# Patient Record
Sex: MALE | HISPANIC OR LATINO | Employment: OTHER | URBAN - METROPOLITAN AREA
[De-identification: names, ages, dates, MRNs, and addresses within clinical notes are randomized per-mention and may not be internally consistent; named-entity substitution may affect disease eponyms.]

---

## 2023-09-26 ENCOUNTER — APPOINTMENT (OUTPATIENT)
Dept: CT IMAGING | Facility: CLINIC | Age: 23
End: 2023-09-26
Attending: EMERGENCY MEDICINE

## 2023-09-26 ENCOUNTER — HOSPITAL ENCOUNTER (EMERGENCY)
Facility: CLINIC | Age: 23
Discharge: HOME | End: 2023-09-27
Attending: EMERGENCY MEDICINE

## 2023-09-26 DIAGNOSIS — S46.012A TRAUMATIC INCOMPLETE TEAR OF LEFT ROTATOR CUFF, INITIAL ENCOUNTER: Primary | ICD-10-CM

## 2023-09-26 LAB
ABO/RH(D): NORMAL
ANTIBODY SCREEN: NEGATIVE
CREAT BLD-MCNC: 0.8 MG/DL (ref 0.7–1.3)
EGFRCR SERPLBLD CKD-EPI 2021: >60 ML/MIN/1.73M2
SPECIMEN EXPIRATION DATE: NORMAL

## 2023-09-26 PROCEDURE — 72125 CT NECK SPINE W/O DYE: CPT | Mod: 26 | Performed by: RADIOLOGY

## 2023-09-26 PROCEDURE — 93308 TTE F-UP OR LMTD: CPT | Mod: 26 | Performed by: EMERGENCY MEDICINE

## 2023-09-26 PROCEDURE — 76705 ECHO EXAM OF ABDOMEN: CPT | Mod: 26 | Performed by: EMERGENCY MEDICINE

## 2023-09-26 PROCEDURE — 36415 COLL VENOUS BLD VENIPUNCTURE: CPT | Performed by: EMERGENCY MEDICINE

## 2023-09-26 PROCEDURE — 96374 THER/PROPH/DIAG INJ IV PUSH: CPT | Mod: 59 | Performed by: EMERGENCY MEDICINE

## 2023-09-26 PROCEDURE — 82077 ASSAY SPEC XCP UR&BREATH IA: CPT | Performed by: EMERGENCY MEDICINE

## 2023-09-26 PROCEDURE — 99285 EMERGENCY DEPT VISIT HI MDM: CPT | Mod: 25 | Performed by: EMERGENCY MEDICINE

## 2023-09-26 PROCEDURE — 72128 CT CHEST SPINE W/O DYE: CPT

## 2023-09-26 PROCEDURE — 93308 TTE F-UP OR LMTD: CPT | Performed by: EMERGENCY MEDICINE

## 2023-09-26 PROCEDURE — 76705 ECHO EXAM OF ABDOMEN: CPT | Mod: 59 | Performed by: EMERGENCY MEDICINE

## 2023-09-26 PROCEDURE — 72131 CT LUMBAR SPINE W/O DYE: CPT | Mod: 26 | Performed by: RADIOLOGY

## 2023-09-26 PROCEDURE — 85610 PROTHROMBIN TIME: CPT | Performed by: EMERGENCY MEDICINE

## 2023-09-26 PROCEDURE — 82565 ASSAY OF CREATININE: CPT

## 2023-09-26 PROCEDURE — 70450 CT HEAD/BRAIN W/O DYE: CPT | Mod: 26 | Performed by: RADIOLOGY

## 2023-09-26 PROCEDURE — 71260 CT THORAX DX C+: CPT

## 2023-09-26 PROCEDURE — 76604 US EXAM CHEST: CPT | Mod: 59 | Performed by: EMERGENCY MEDICINE

## 2023-09-26 PROCEDURE — 99203 OFFICE O/P NEW LOW 30 MIN: CPT | Mod: GC | Performed by: SURGERY

## 2023-09-26 PROCEDURE — 682N000003 HC TRAUMA EVALUATION W/O CC LEVEL II: Performed by: EMERGENCY MEDICINE

## 2023-09-26 PROCEDURE — 999N000188 HC STATISTIC TRAUMA CODE W/O ACCESS

## 2023-09-26 PROCEDURE — 80053 COMPREHEN METABOLIC PANEL: CPT | Performed by: EMERGENCY MEDICINE

## 2023-09-26 PROCEDURE — 76604 US EXAM CHEST: CPT | Mod: 26 | Performed by: EMERGENCY MEDICINE

## 2023-09-26 PROCEDURE — 85025 COMPLETE CBC W/AUTO DIFF WBC: CPT | Performed by: EMERGENCY MEDICINE

## 2023-09-26 PROCEDURE — 250N000011 HC RX IP 250 OP 636: Performed by: EMERGENCY MEDICINE

## 2023-09-26 PROCEDURE — 71260 CT THORAX DX C+: CPT | Mod: 26 | Performed by: RADIOLOGY

## 2023-09-26 PROCEDURE — 70450 CT HEAD/BRAIN W/O DYE: CPT

## 2023-09-26 PROCEDURE — 72125 CT NECK SPINE W/O DYE: CPT

## 2023-09-26 PROCEDURE — 72128 CT CHEST SPINE W/O DYE: CPT | Mod: 26 | Performed by: RADIOLOGY

## 2023-09-26 PROCEDURE — 72131 CT LUMBAR SPINE W/O DYE: CPT

## 2023-09-26 PROCEDURE — 74177 CT ABD & PELVIS W/CONTRAST: CPT | Mod: 26 | Performed by: RADIOLOGY

## 2023-09-26 PROCEDURE — 86900 BLOOD TYPING SEROLOGIC ABO: CPT | Performed by: EMERGENCY MEDICINE

## 2023-09-26 PROCEDURE — 85730 THROMBOPLASTIN TIME PARTIAL: CPT | Performed by: EMERGENCY MEDICINE

## 2023-09-26 RX ORDER — LIDOCAINE 40 MG/G
CREAM TOPICAL
Status: DISCONTINUED | OUTPATIENT
Start: 2023-09-26 | End: 2023-09-27 | Stop reason: HOSPADM

## 2023-09-26 RX ORDER — IOPAMIDOL 755 MG/ML
111 INJECTION, SOLUTION INTRAVASCULAR ONCE
Status: COMPLETED | OUTPATIENT
Start: 2023-09-26 | End: 2023-09-26

## 2023-09-26 RX ADMIN — IOPAMIDOL 111 ML: 755 INJECTION, SOLUTION INTRAVENOUS at 23:40

## 2023-09-26 NOTE — LETTER
September 27, 2023      To Whom It May Concern:      Jose Nguyễn was seen in our Emergency Department today, 09/27/23.  I expect his condition to improve over the next 2 days.  He may return to work/school when improved.    Sincerely,        Joselito Rahman DO

## 2023-09-27 ENCOUNTER — APPOINTMENT (OUTPATIENT)
Dept: GENERAL RADIOLOGY | Facility: CLINIC | Age: 23
End: 2023-09-27
Attending: EMERGENCY MEDICINE

## 2023-09-27 VITALS
WEIGHT: 185 LBS | HEIGHT: 67 IN | OXYGEN SATURATION: 98 % | DIASTOLIC BLOOD PRESSURE: 81 MMHG | BODY MASS INDEX: 29.03 KG/M2 | SYSTOLIC BLOOD PRESSURE: 123 MMHG | RESPIRATION RATE: 14 BRPM | HEART RATE: 62 BPM | TEMPERATURE: 98 F

## 2023-09-27 LAB
ALBUMIN SERPL BCG-MCNC: 4.5 G/DL (ref 3.5–5.2)
ALP SERPL-CCNC: 72 U/L (ref 40–129)
ALT SERPL W P-5'-P-CCNC: 44 U/L (ref 0–70)
ANION GAP SERPL CALCULATED.3IONS-SCNC: 14 MMOL/L (ref 7–15)
APTT PPP: 28 SECONDS (ref 22–38)
AST SERPL W P-5'-P-CCNC: 45 U/L (ref 0–45)
BASOPHILS # BLD AUTO: 0.1 10E3/UL (ref 0–0.2)
BASOPHILS NFR BLD AUTO: 1 %
BILIRUB SERPL-MCNC: 1 MG/DL
BUN SERPL-MCNC: 18.9 MG/DL (ref 6–20)
CALCIUM SERPL-MCNC: 9.4 MG/DL (ref 8.6–10)
CHLORIDE SERPL-SCNC: 104 MMOL/L (ref 98–107)
CREAT SERPL-MCNC: 0.77 MG/DL (ref 0.67–1.17)
DEPRECATED HCO3 PLAS-SCNC: 21 MMOL/L (ref 22–29)
EGFRCR SERPLBLD CKD-EPI 2021: >90 ML/MIN/1.73M2
EOSINOPHIL # BLD AUTO: 0.1 10E3/UL (ref 0–0.7)
EOSINOPHIL NFR BLD AUTO: 1 %
ERYTHROCYTE [DISTWIDTH] IN BLOOD BY AUTOMATED COUNT: 13 % (ref 10–15)
ETHANOL SERPL-MCNC: <0.01 G/DL
GLUCOSE SERPL-MCNC: 96 MG/DL (ref 70–99)
HCT VFR BLD AUTO: 42.8 % (ref 40–53)
HGB BLD-MCNC: 14.8 G/DL (ref 13.3–17.7)
HOLD SPECIMEN: NORMAL
IMM GRANULOCYTES # BLD: 0 10E3/UL
IMM GRANULOCYTES NFR BLD: 0 %
INR PPP: 1.01 (ref 0.85–1.15)
LYMPHOCYTES # BLD AUTO: 1.4 10E3/UL (ref 0.8–5.3)
LYMPHOCYTES NFR BLD AUTO: 16 %
MCH RBC QN AUTO: 28.8 PG (ref 26.5–33)
MCHC RBC AUTO-ENTMCNC: 34.6 G/DL (ref 31.5–36.5)
MCV RBC AUTO: 83 FL (ref 78–100)
MONOCYTES # BLD AUTO: 0.8 10E3/UL (ref 0–1.3)
MONOCYTES NFR BLD AUTO: 8 %
NEUTROPHILS # BLD AUTO: 6.8 10E3/UL (ref 1.6–8.3)
NEUTROPHILS NFR BLD AUTO: 74 %
NRBC # BLD AUTO: 0 10E3/UL
NRBC BLD AUTO-RTO: 0 /100
PLATELET # BLD AUTO: 353 10E3/UL (ref 150–450)
POTASSIUM SERPL-SCNC: 3.7 MMOL/L (ref 3.4–5.3)
PROT SERPL-MCNC: 7.7 G/DL (ref 6.4–8.3)
RBC # BLD AUTO: 5.14 10E6/UL (ref 4.4–5.9)
SODIUM SERPL-SCNC: 139 MMOL/L (ref 135–145)
WBC # BLD AUTO: 9.2 10E3/UL (ref 4–11)

## 2023-09-27 PROCEDURE — 73030 X-RAY EXAM OF SHOULDER: CPT | Mod: 26 | Performed by: RADIOLOGY

## 2023-09-27 PROCEDURE — 73030 X-RAY EXAM OF SHOULDER: CPT | Mod: LT

## 2023-09-27 PROCEDURE — 250N000011 HC RX IP 250 OP 636: Performed by: EMERGENCY MEDICINE

## 2023-09-27 RX ORDER — HYDROCODONE BITARTRATE AND ACETAMINOPHEN 5; 325 MG/1; MG/1
1 TABLET ORAL EVERY 6 HOURS PRN
Qty: 10 TABLET | Refills: 0 | Status: SHIPPED | OUTPATIENT
Start: 2023-09-27 | End: 2023-09-30

## 2023-09-27 RX ORDER — IBUPROFEN 600 MG/1
600 TABLET, FILM COATED ORAL EVERY 6 HOURS PRN
Qty: 30 TABLET | Refills: 0 | Status: SHIPPED | OUTPATIENT
Start: 2023-09-27

## 2023-09-27 RX ORDER — HYDROMORPHONE HYDROCHLORIDE 1 MG/ML
0.5 INJECTION, SOLUTION INTRAMUSCULAR; INTRAVENOUS; SUBCUTANEOUS
Status: COMPLETED | OUTPATIENT
Start: 2023-09-27 | End: 2023-09-27

## 2023-09-27 RX ADMIN — HYDROMORPHONE HYDROCHLORIDE 0.5 MG: 1 INJECTION, SOLUTION INTRAMUSCULAR; INTRAVENOUS; SUBCUTANEOUS at 00:17

## 2023-09-27 ASSESSMENT — ACTIVITIES OF DAILY LIVING (ADL): ADLS_ACUITY_SCORE: 35

## 2023-09-27 NOTE — ED TRIAGE NOTES
Patient presents after a fall this afternoon at work, fell from stated 20 meters onto his left side, denies hitting his head or LOC from fall, complains of pain in left shoulder, limited ROM due to pain, 8/10, no meds PTA, decrease sensation in LUE     Triage Assessment       Row Name 09/26/23 0650       Triage Assessment (Adult)    Airway WDL WDL       Respiratory WDL    Respiratory WDL WDL       Skin Circulation/Temperature WDL    Skin Circulation/Temperature WDL WDL       Cardiac WDL    Cardiac WDL WDL       Peripheral/Neurovascular WDL    Peripheral Neurovascular WDL WDL       Cognitive/Neuro/Behavioral WDL    Cognitive/Neuro/Behavioral WDL WDL

## 2023-09-27 NOTE — ED PROVIDER NOTES
"ED Provider Note  [facility]      History     Chief Complaint   Patient presents with    Fall     The history is limited by a language barrier. A  was used ((Kinyarwanda)).     Jose Nguyễn is a 23 year old male with no PMH listed in EPIC who presents to the ED following a fall this afternoon at work. He states falling 10 -15 meters onto his left side (about 2-3 stories) at 2pm today. He states falling from a roof onto grass. Denies hitting his head or LOC. Complains of pain in left shoulder, limited ROM due to pain, 8/10. He also states pain throughout his arm and left lower back with movement. He is able to walk, albeit with some difficulty. No meds PTA. He denies any chronic medical issues and takes no medications.     Past Medical History  No past medical history on file.  No past surgical history on file.  HYDROcodone-acetaminophen (NORCO) 5-325 MG tablet  ibuprofen (ADVIL/MOTRIN) 600 MG tablet      No Known Allergies  Family History  No family history on file.  Social History       Past medical history, past surgical history, medications, allergies, family history, and social history were reviewed with the patient. No additional pertinent items.      A complete review of systems was performed with pertinent positives and negatives noted in the HPI, and all other systems negative.    Physical Exam   BP: 130/86  Pulse: 74  Temp: 98.4  F (36.9  C)  Resp: 18  Height: 170 cm (5' 6.93\")  Weight: 83.9 kg (185 lb)  SpO2: 99 %  Physical Exam  Vitals and nursing note reviewed.   Constitutional:       General: He is in acute distress.      Appearance: Normal appearance.   HENT:      Head: Normocephalic and atraumatic.      Nose: Nose normal.   Eyes:      Pupils: Pupils are equal, round, and reactive to light.   Neck:      Comments: Cervical collar applied during exam  Cardiovascular:      Rate and Rhythm: Normal rate and regular rhythm.   Pulmonary:      " Effort: Pulmonary effort is normal.   Abdominal:      General: There is no distension.      Palpations: Abdomen is soft.      Tenderness: There is no abdominal tenderness. There is no guarding.   Musculoskeletal:         General: No deformity. Normal range of motion.        Arms:       Cervical back: Normal range of motion and neck supple. No rigidity.   Skin:     General: Skin is warm.   Neurological:      Mental Status: He is alert and oriented to person, place, and time.   Psychiatric:         Mood and Affect: Mood normal.           ED Course, Procedures, & Data     Trauma:  Level of trauma activation: Partial  Full Primary and Secondary survey with appropriate immobilization of spine completed in exam section.  C-collar and immobilization: applied in ED on initial evaluation.  CSpine Clearance: by [employer] Criteria  GCS at arrival: 15  GCS at disposition: unchanged  Consults prior to admission or transfer: Trauma consult called at 2315  Procedures done in the ED: none  Disposition: Discharge. Patient stable after completion of evaluation.                Results for orders placed or performed during the hospital encounter of 09/26/23   POC US ABDOMEN LIMITED     Status: None (In process)    Impression    Bedside FAST (Focused Assessment with Sonography in Trauma), performed and interpreted by me.   Indication: Trauma    With the patient in Trendelenburg, the RUQ, LUQ and subxiphoid views were examined for intraabdominal and thoracic free fluid and pericardial effusion. With the patient in reverse Trendelenburg, the suprapubic view was examined for intraabdominal free fluid. Image quality was satisfactory..     Findings: There is no evidence of free fluid above or below bilateral diaphragms, in the splenorenal or hepatorenal space, or in bilateral paracolic gutters. There was no free fluid seen in the pelvis adjacent to the urinary bladder. There is no free fluid within the pericardium.        IMPRESSION:  Negative  FAST    CT Head w/o Contrast     Status: None    Narrative    EXAM: CT CERVICAL SPINE W/O CONTRAST, CT LUMBAR SPINE W/O CONTRAST, CT THORACIC SPINE W/O CONTRAST, CT HEAD W/O CONTRAST  LOCATION: [facility]  DATE: 9/26/2023    INDICATION: Head, neck, and back injury  COMPARISON: None.  TECHNIQUE:   1) Routine CT Head without IV contrast. Multiplanar reformats. Dose reduction techniques were used.   2) Routine CT Cervical Spine without IV contrast. Multiplanar reformats. Dose reduction techniques were used.   3) Routine CT Thoracic Spine without IV contrast. Multiplanar reformats. Dose reduction techniques were used.   4) Routine CT Lumbar Spine without IV contrast. Multiplanar reformats. Dose reduction techniques were used.     FINDINGS:   HEAD CT:   INTRACRANIAL CONTENTS: No intracranial hemorrhage, extraaxial collection, or mass effect.  No CT evidence of acute infarct. Normal parenchymal attenuation. Normal ventricles and sulci.     VISUALIZED ORBITS/SINUSES/MASTOIDS: No intraorbital abnormality. No paranasal sinus mucosal disease. No middle ear or mastoid effusion.    BONES/SOFT TISSUES: No acute abnormality.    CERVICAL SPINE CT:  VERTEBRA: Normal vertebral body heights. Straightening of cervical lordosis. Slight right cervical curve. No fracture or posttraumatic subluxation.     CANAL/FORAMINA: No canal or neural foraminal stenosis.    PARASPINAL: No extraspinal abnormality. Visualized lung fields are clear.    THORACIC SPINE CT:  VERTEBRA: Normal vertebral body heights. No fracture or posttraumatic subluxation.     CANAL/FORAMINA: No canal or neural foraminal stenosis.    PARASPINAL: No extraspinal abnormality.    LUMBAR SPINE CT:  VERTEBRA: Normal vertebral body heights. Straightening of lumbar lordosis. No fracture or posttraumatic subluxation.     CANAL/FORAMINA: No canal or neural foraminal stenosis.    PARASPINAL: No extraspinal abnormality.      Impression     IMPRESSION:  HEAD CT:  Normal head CT.    CERVICAL SPINE CT:  1.  No fracture or posttraumatic subluxation.  2.  No high-grade spinal canal or neural foraminal stenosis.    THORACIC SPINE CT:  No high-grade spinal canal or neural foraminal stenosis.    LUMBAR SPINE CT:  1.  No fracture or posttraumatic subluxation.  2.  No high-grade spinal canal or neural foraminal stenosis.     CT Cervical Spine w/o Contrast     Status: None    Narrative    EXAM: CT CERVICAL SPINE W/O CONTRAST, CT LUMBAR SPINE W/O CONTRAST, CT THORACIC SPINE W/O CONTRAST, CT HEAD W/O CONTRAST  LOCATION: [facility]  DATE: 9/26/2023    INDICATION: Head, neck, and back injury  COMPARISON: None.  TECHNIQUE:   1) Routine CT Head without IV contrast. Multiplanar reformats. Dose reduction techniques were used.   2) Routine CT Cervical Spine without IV contrast. Multiplanar reformats. Dose reduction techniques were used.   3) Routine CT Thoracic Spine without IV contrast. Multiplanar reformats. Dose reduction techniques were used.   4) Routine CT Lumbar Spine without IV contrast. Multiplanar reformats. Dose reduction techniques were used.     FINDINGS:   HEAD CT:   INTRACRANIAL CONTENTS: No intracranial hemorrhage, extraaxial collection, or mass effect.  No CT evidence of acute infarct. Normal parenchymal attenuation. Normal ventricles and sulci.     VISUALIZED ORBITS/SINUSES/MASTOIDS: No intraorbital abnormality. No paranasal sinus mucosal disease. No middle ear or mastoid effusion.    BONES/SOFT TISSUES: No acute abnormality.    CERVICAL SPINE CT:  VERTEBRA: Normal vertebral body heights. Straightening of cervical lordosis. Slight right cervical curve. No fracture or posttraumatic subluxation.     CANAL/FORAMINA: No canal or neural foraminal stenosis.    PARASPINAL: No extraspinal abnormality. Visualized lung fields are clear.    THORACIC SPINE CT:  VERTEBRA: Normal vertebral body heights. No fracture or  posttraumatic subluxation.     CANAL/FORAMINA: No canal or neural foraminal stenosis.    PARASPINAL: No extraspinal abnormality.    LUMBAR SPINE CT:  VERTEBRA: Normal vertebral body heights. Straightening of lumbar lordosis. No fracture or posttraumatic subluxation.     CANAL/FORAMINA: No canal or neural foraminal stenosis.    PARASPINAL: No extraspinal abnormality.      Impression    IMPRESSION:  HEAD CT:  Normal head CT.    CERVICAL SPINE CT:  1.  No fracture or posttraumatic subluxation.  2.  No high-grade spinal canal or neural foraminal stenosis.    THORACIC SPINE CT:  No high-grade spinal canal or neural foraminal stenosis.    LUMBAR SPINE CT:  1.  No fracture or posttraumatic subluxation.  2.  No high-grade spinal canal or neural foraminal stenosis.     CT Thoracic Spine w/o Contrast     Status: None    Narrative    EXAM: CT CERVICAL SPINE W/O CONTRAST, CT LUMBAR SPINE W/O CONTRAST, CT THORACIC SPINE W/O CONTRAST, CT HEAD W/O CONTRAST  LOCATION: [facility]  DATE: 9/26/2023    INDICATION: Head, neck, and back injury  COMPARISON: None.  TECHNIQUE:   1) Routine CT Head without IV contrast. Multiplanar reformats. Dose reduction techniques were used.   2) Routine CT Cervical Spine without IV contrast. Multiplanar reformats. Dose reduction techniques were used.   3) Routine CT Thoracic Spine without IV contrast. Multiplanar reformats. Dose reduction techniques were used.   4) Routine CT Lumbar Spine without IV contrast. Multiplanar reformats. Dose reduction techniques were used.     FINDINGS:   HEAD CT:   INTRACRANIAL CONTENTS: No intracranial hemorrhage, extraaxial collection, or mass effect.  No CT evidence of acute infarct. Normal parenchymal attenuation. Normal ventricles and sulci.     VISUALIZED ORBITS/SINUSES/MASTOIDS: No intraorbital abnormality. No paranasal sinus mucosal disease. No middle ear or mastoid effusion.    BONES/SOFT TISSUES: No acute  abnormality.    CERVICAL SPINE CT:  VERTEBRA: Normal vertebral body heights. Straightening of cervical lordosis. Slight right cervical curve. No fracture or posttraumatic subluxation.     CANAL/FORAMINA: No canal or neural foraminal stenosis.    PARASPINAL: No extraspinal abnormality. Visualized lung fields are clear.    THORACIC SPINE CT:  VERTEBRA: Normal vertebral body heights. No fracture or posttraumatic subluxation.     CANAL/FORAMINA: No canal or neural foraminal stenosis.    PARASPINAL: No extraspinal abnormality.    LUMBAR SPINE CT:  VERTEBRA: Normal vertebral body heights. Straightening of lumbar lordosis. No fracture or posttraumatic subluxation.     CANAL/FORAMINA: No canal or neural foraminal stenosis.    PARASPINAL: No extraspinal abnormality.      Impression    IMPRESSION:  HEAD CT:  Normal head CT.    CERVICAL SPINE CT:  1.  No fracture or posttraumatic subluxation.  2.  No high-grade spinal canal or neural foraminal stenosis.    THORACIC SPINE CT:  No high-grade spinal canal or neural foraminal stenosis.    LUMBAR SPINE CT:  1.  No fracture or posttraumatic subluxation.  2.  No high-grade spinal canal or neural foraminal stenosis.     CT Lumbar Spine w/o Contrast     Status: None    Narrative    EXAM: CT CERVICAL SPINE W/O CONTRAST, CT LUMBAR SPINE W/O CONTRAST, CT THORACIC SPINE W/O CONTRAST, CT HEAD W/O CONTRAST  LOCATION: [facility]  DATE: 9/26/2023    INDICATION: Head, neck, and back injury  COMPARISON: None.  TECHNIQUE:   1) Routine CT Head without IV contrast. Multiplanar reformats. Dose reduction techniques were used.   2) Routine CT Cervical Spine without IV contrast. Multiplanar reformats. Dose reduction techniques were used.   3) Routine CT Thoracic Spine without IV contrast. Multiplanar reformats. Dose reduction techniques were used.   4) Routine CT Lumbar Spine without IV contrast. Multiplanar reformats. Dose reduction techniques were used.      FINDINGS:   HEAD CT:   INTRACRANIAL CONTENTS: No intracranial hemorrhage, extraaxial collection, or mass effect.  No CT evidence of acute infarct. Normal parenchymal attenuation. Normal ventricles and sulci.     VISUALIZED ORBITS/SINUSES/MASTOIDS: No intraorbital abnormality. No paranasal sinus mucosal disease. No middle ear or mastoid effusion.    BONES/SOFT TISSUES: No acute abnormality.    CERVICAL SPINE CT:  VERTEBRA: Normal vertebral body heights. Straightening of cervical lordosis. Slight right cervical curve. No fracture or posttraumatic subluxation.     CANAL/FORAMINA: No canal or neural foraminal stenosis.    PARASPINAL: No extraspinal abnormality. Visualized lung fields are clear.    THORACIC SPINE CT:  VERTEBRA: Normal vertebral body heights. No fracture or posttraumatic subluxation.     CANAL/FORAMINA: No canal or neural foraminal stenosis.    PARASPINAL: No extraspinal abnormality.    LUMBAR SPINE CT:  VERTEBRA: Normal vertebral body heights. Straightening of lumbar lordosis. No fracture or posttraumatic subluxation.     CANAL/FORAMINA: No canal or neural foraminal stenosis.    PARASPINAL: No extraspinal abnormality.      Impression    IMPRESSION:  HEAD CT:  Normal head CT.    CERVICAL SPINE CT:  1.  No fracture or posttraumatic subluxation.  2.  No high-grade spinal canal or neural foraminal stenosis.    THORACIC SPINE CT:  No high-grade spinal canal or neural foraminal stenosis.    LUMBAR SPINE CT:  1.  No fracture or posttraumatic subluxation.  2.  No high-grade spinal canal or neural foraminal stenosis.     CT Chest/Abdomen/Pelvis w Contrast     Status: None    Narrative    EXAM: CT CHEST/ABDOMEN/PELVIS W CONTRAST  LOCATION: [facility]  DATE/TIME: 9/27/2023 12:05 AM CDT    INDICATION: Fall/pain.  COMPARISON: None available.  TECHNIQUE: CT scan of the chest, abdomen, and pelvis was performed after the injection of iopamidol (ISOVUE-370) solution 111  mL intravenously. Multiplanar reformats were obtained. Dose reduction techniques were used.     FINDINGS:    MEDIASTINUM/AXILLAE: No cardiomegaly. Trace pericardial effusion. No significant mediastinal, hilar or axillary lymphadenopathy.    LUNGS AND PLEURA: No pleural effusion or pneumothorax. Bibasilar pulmonary opacities, likely atelectasis.    CORONARY ARTERY CALCIFICATION: None.    ABDOMEN/PELVIS:    HEPATOBILIARY: No suspicious focal hepatic lesion. The gallbladder is unremarkable.    PANCREAS: No main pancreatic ductal dilatation or definite solid pancreatic mass.    SPLEEN: No splenomegaly.    ADRENAL GLANDS: No adrenal nodules.    KIDNEYS/BLADDER: No radiodense kidney/ureteral stones or hydronephrosis in either kidney. Small renal cyst at the upper pole of the right kidney.    BOWEL: No abnormally dilated bowel loops. The appendix is visualized and appears normal.    PERITONEUM: No evidence of free fluid in the abdomen and pelvis. No free peritoneal or portal venous gas.    PELVIC ORGANS: Unremarkable.    VASCULATURE: Unremarkable.    LYMPH NODES: Unremarkable.    MUSCULOSKELETAL: No suspicious osseous lesion. Please refer to concurrently performed thoracic and lumbar spine CT for findings related to thoracic and lumbar spine.      Impression    IMPRESSION:  1.  No evidence of acute traumatic injury in the chest, abdomen or pelvis. Please refer to concurrently performed thoracic and lumbar spine CT for findings related to the thoracic and lumbar spine.   XR Shoulder Left Port G/E 2 Views     Status: None    Narrative    EXAM: XR SHOULDER LEFT PORT G/E 2 VIEWS  LOCATION: [facility]  DATE: 9/27/2023    INDICATION: Fall at work from 20 meters height, now with pain; tech note: pt came to us on cervical precautions so I had to keep him flat and use tube angles for the rotated views.  COMPARISON: None available.      Impression    IMPRESSION: Multiple views of the  left shoulder were obtained. No evidence of acute fracture or dislocation.   Comprehensive metabolic panel     Status: Abnormal   Result Value Ref Range    Sodium 139 135 - 145 mmol/L    Potassium 3.7 3.4 - 5.3 mmol/L    Carbon Dioxide (CO2) 21 (L) 22 - 29 mmol/L    Anion Gap 14 7 - 15 mmol/L    Urea Nitrogen 18.9 6.0 - 20.0 mg/dL    Creatinine 0.77 0.67 - 1.17 mg/dL    GFR Estimate >90 >60 mL/min/1.73m2    Calcium 9.4 8.6 - 10.0 mg/dL    Chloride 104 98 - 107 mmol/L    Glucose 96 70 - 99 mg/dL    Alkaline Phosphatase 72 40 - 129 U/L    AST 45 0 - 45 U/L    ALT 44 0 - 70 U/L    Protein Total 7.7 6.4 - 8.3 g/dL    Albumin 4.5 3.5 - 5.2 g/dL    Bilirubin Total 1.0 <=1.2 mg/dL   INR     Status: Normal   Result Value Ref Range    INR 1.01 0.85 - 1.15   Partial thromboplastin time     Status: Normal   Result Value Ref Range    aPTT 28 22 - 38 Seconds   Alcohol ethyl     Status: Normal   Result Value Ref Range    Alcohol ethyl <0.01 <=0.01 g/dL   [place] Draw     Status: None (In process)    Narrative    The following orders were created for panel order [place] Draw.  Procedure                               Abnormality         Status                     ---------                               -----------         ------                     Extra Red Top Tube[[id number]]                               In process                 Extra Green Top (Lithium...[[id number]]                                                 Extra Purple Top Tube[[id number]]                                                         Please view results for these tests on the individual orders.   CBC with platelets and differential     Status: None   Result Value Ref Range    WBC Count 9.2 4.0 - 11.0 10e3/uL    RBC Count 5.14 4.40 - 5.90 10e6/uL    Hemoglobin 14.8 13.3 - 17.7 g/dL    Hematocrit 42.8 40.0 - 53.0 %    MCV 83 78 - 100 fL    MCH 28.8 26.5 - 33.0 pg    MCHC 34.6 31.5 - 36.5 g/dL    RDW 13.0 10.0 - 15.0 %    Platelet Count 353 150 - 450 10e3/uL     % Neutrophils 74 %    % Lymphocytes 16 %    % Monocytes 8 %    % Eosinophils 1 %    % Basophils 1 %    % Immature Granulocytes 0 %    NRBCs per 100 WBC 0 <1 /100    Absolute Neutrophils 6.8 1.6 - 8.3 10e3/uL    Absolute Lymphocytes 1.4 0.8 - 5.3 10e3/uL    Absolute Monocytes 0.8 0.0 - 1.3 10e3/uL    Absolute Eosinophils 0.1 0.0 - 0.7 10e3/uL    Absolute Basophils 0.1 0.0 - 0.2 10e3/uL    Absolute Immature Granulocytes 0.0 <=0.4 10e3/uL    Absolute NRBCs 0.0 10e3/uL   Creatinine POCT     Status: Normal   Result Value Ref Range    Creatinine POCT 0.8 0.7 - 1.3 mg/dL    GFR, ESTIMATED POCT >60 >60 mL/min/1.73m2   Adult Type and Screen     Status: None   Result Value Ref Range    ABO/RH(D) O POS     Antibody Screen Negative Negative    SPECIMEN EXPIRATION DATE [id number]    CBC with platelets differential     Status: None    Narrative    The following orders were created for panel order CBC with platelets differential.  Procedure                               Abnormality         Status                     ---------                               -----------         ------                     CBC with platelets and d...[[id number]]                      Final result                 Please view results for these tests on the individual orders.   ABO/Rh type and screen     Status: None    Narrative    The following orders were created for panel order ABO/Rh type and screen.  Procedure                               Abnormality         Status                     ---------                               -----------         ------                     Adult Type and Screen[[id number]]                            Final result                 Please view results for these tests on the individual orders.     Medications   lidocaine 1 % 0.1-1 mL (has no administration in time range)   lidocaine (LMX4) cream (has no administration in time range)   sodium chloride (PF) 0.9% PF flush 3 mL (3 mLs Intracatheter $Given 9/27/23 0017)    sodium chloride (PF) 0.9% PF flush 3 mL (has no administration in time range)   iopamidol (ISOVUE-370) solution 111 mL (111 mLs Intravenous $Given 9/26/23 2340)   sodium chloride (PF) 0.9% PF flush 94 mL (94 mLs Intravenous $Given 9/26/23 2344)   HYDROmorphone (PF) (DILAUDID) injection 0.5 mg (0.5 mg Intravenous $Given 9/27/23 0017)     Labs Ordered and Resulted from Time of ED Arrival to Time of ED Departure   COMPREHENSIVE METABOLIC PANEL - Abnormal       Result Value    Sodium 139      Potassium 3.7      Carbon Dioxide (CO2) 21 (*)     Anion Gap 14      Urea Nitrogen 18.9      Creatinine 0.77      GFR Estimate >90      Calcium 9.4      Chloride 104      Glucose 96      Alkaline Phosphatase 72      AST 45      ALT 44      Protein Total 7.7      Albumin 4.5      Bilirubin Total 1.0     INR - Normal    INR 1.01     PARTIAL THROMBOPLASTIN TIME - Normal    aPTT 28     ETHYL ALCOHOL LEVEL - Normal    Alcohol ethyl <0.01     ISTAT CREATININE POCT - Normal    Creatinine POCT 0.8      GFR, ESTIMATED POCT >60     CBC WITH PLATELETS AND DIFFERENTIAL    WBC Count 9.2      RBC Count 5.14      Hemoglobin 14.8      Hematocrit 42.8      MCV 83      MCH 28.8      MCHC 34.6      RDW 13.0      Platelet Count 353      % Neutrophils 74      % Lymphocytes 16      % Monocytes 8      % Eosinophils 1      % Basophils 1      % Immature Granulocytes 0      NRBCs per 100 WBC 0      Absolute Neutrophils 6.8      Absolute Lymphocytes 1.4      Absolute Monocytes 0.8      Absolute Eosinophils 0.1      Absolute Basophils 0.1      Absolute Immature Granulocytes 0.0      Absolute NRBCs 0.0     TYPE AND SCREEN, ADULT    ABO/RH(D) O POS      Antibody Screen Negative      SPECIMEN EXPIRATION DATE [id number]     ABO/RH TYPE AND SCREEN     CT Lumbar Spine w/o Contrast   Final Result   IMPRESSION:   HEAD CT:   Normal head CT.      CERVICAL SPINE CT:   1.  No fracture or posttraumatic subluxation.   2.  No high-grade spinal canal or neural  foraminal stenosis.      THORACIC SPINE CT:   No high-grade spinal canal or neural foraminal stenosis.      LUMBAR SPINE CT:   1.  No fracture or posttraumatic subluxation.   2.  No high-grade spinal canal or neural foraminal stenosis.         CT Thoracic Spine w/o Contrast   Final Result   IMPRESSION:   HEAD CT:   Normal head CT.      CERVICAL SPINE CT:   1.  No fracture or posttraumatic subluxation.   2.  No high-grade spinal canal or neural foraminal stenosis.      THORACIC SPINE CT:   No high-grade spinal canal or neural foraminal stenosis.      LUMBAR SPINE CT:   1.  No fracture or posttraumatic subluxation.   2.  No high-grade spinal canal or neural foraminal stenosis.         CT Chest/Abdomen/Pelvis w Contrast   Final Result   IMPRESSION:   1.  No evidence of acute traumatic injury in the chest, abdomen or pelvis. Please refer to concurrently performed thoracic and lumbar spine CT for findings related to the thoracic and lumbar spine.      XR Shoulder Left Port G/E 2 Views   Final Result   IMPRESSION: Multiple views of the left shoulder were obtained. No evidence of acute fracture or dislocation.      CT Head w/o Contrast   Final Result   IMPRESSION:   HEAD CT:   Normal head CT.      CERVICAL SPINE CT:   1.  No fracture or posttraumatic subluxation.   2.  No high-grade spinal canal or neural foraminal stenosis.      THORACIC SPINE CT:   No high-grade spinal canal or neural foraminal stenosis.      LUMBAR SPINE CT:   1.  No fracture or posttraumatic subluxation.   2.  No high-grade spinal canal or neural foraminal stenosis.         CT Cervical Spine w/o Contrast   Final Result   IMPRESSION:   HEAD CT:   Normal head CT.      CERVICAL SPINE CT:   1.  No fracture or posttraumatic subluxation.   2.  No high-grade spinal canal or neural foraminal stenosis.      THORACIC SPINE CT:   No high-grade spinal canal or neural foraminal stenosis.      LUMBAR SPINE CT:   1.  No fracture or posttraumatic subluxation.   2.  No  high-grade spinal canal or neural foraminal stenosis.         POC US ABDOMEN LIMITED   Preliminary Result   Bedside FAST (Focused Assessment with Sonography in Trauma), performed and interpreted by me.    Indication: Trauma      With the patient in Trendelenburg, the RUQ, LUQ and subxiphoid views were examined for intraabdominal and thoracic free fluid and pericardial effusion. With the patient in reverse Trendelenburg, the suprapubic view was examined for intraabdominal free fluid. Image quality was satisfactory..       Findings: There is no evidence of free fluid above or below bilateral diaphragms, in the splenorenal or hepatorenal space, or in bilateral paracolic gutters. There was no free fluid seen in the pelvis adjacent to the urinary bladder. There is no free fluid within the pericardium.           IMPRESSION:  Negative FAST              Critical care was not performed.     Medical Decision Making  The patient's presentation was of high complexity (an acute health issue posing potential threat to life or bodily function).    The patient's evaluation involved:  ordering and/or review of 3+ test(s) in this encounter (see separate area of note for details)  discussion of management or test interpretation with another health professional (trauma surgery)    The patient's management necessitated high risk (a parenteral controlled substance).    Assessment & Plan    Patient presents the ED for evaluation after a fall.  He states he fell approximately 20 to 25 feet off of the second story of the house.  He fell onto the grass.  He has pain primarily to his left shoulder.  Has significant tenderness to his left chest wall, left back, and thoracic/lumbar spine on exam.  Was placed in a c-collar and a partial trauma activation was made on arrival based on height of fall.  FAST exam is negative.  Case discussed with trauma staff/resident.  Evaluated by the resident in the ED.    Shoulder x-ray images reviewed by me.   No evidence of acute fracture / dislocation.  Patient continues to have significant pain to the general shoulder joint and has limited range of motion.  I suspect likely traumatic rotator cuff tear.  Patient was given sling and will need orthopedic follow-up.    CT pan scan was evaluated by myself, trauma team, and radiology.  Negative for any acute traumatic injuries.  C collar cleared.  Per trauma, patient is appropriate for discharge.  Can follow-up with PCP as well as Ortho.    Findings and plan of care discussed with the patient.  All information relayed via .  Will discharge with ibuprofen and short course of as needed pain medication.        I have reviewed the nursing notes. I have reviewed the findings, diagnosis, plan and need for follow up with the patient.    New Prescriptions    HYDROCODONE-ACETAMINOPHEN (NORCO) 5-325 MG TABLET    Take 1 tablet by mouth every 6 hours as needed for severe pain    IBUPROFEN (ADVIL/MOTRIN) 600 MG TABLET    Take 1 tablet (600 mg) by mouth every 6 hours as needed for moderate pain       Final diagnoses:   Traumatic incomplete tear of left rotator cuff, initial encounter   I, Mandi Dove, am serving as a trained medical scribe to document services personally performed by Joselito Rahman MD based on the provider's statements to me on September 26, 2023.  This document has been checked and approved by the attending provider.    I, Joselito Rahman MD, was physically present and have reviewed and verified the accuracy of this note documented by Mandi Dove medical scribe.      Joselito Rahman MD  [facility] EMERGENCY DEPARTMENT  9/26/2023     Joselito Rahman DO  09/27/23 0133

## 2023-09-27 NOTE — DISCHARGE INSTRUCTIONS
Your work-up in the emergency department including x-rays and multiple CT scans is reassuring against major traumatic injury.  I suspect you have a rotator cuff tear of your left shoulder.  You need to follow-up with orthopedic team.  They should contact you to schedule follow-up appointment.  In the meantime, here are some exercises that can be helpful:    Pendulum swings  Patient bends slightly at waist with arm hanging freely in front of body  Arms should be swung in gentle arcs of motion both clockwise and counter-clockwise  Swing to level of pain tolerance x 5-10min 3x-4x / day    Walk fingers up wall  Stand sideways an arm's length from wall and walk fingers up wall to level of pain tolerance 3x-4x / day    The [facility]  will call you to coordinate your care as prescribed by your provider. A representative will call you within 2 business days to help you schedule your appointment, or you may contact the  Representative at: [phone].

## 2023-09-27 NOTE — CONSULTS
[facility]    History and Physical / Consult note: Trauma Service     Date of Admission:  9/26/2023    Time of Admission/Consult Request (page/call): 2330    Time of my evaluation: 3461  Consulting services:  None    Assessment & Plan   Trauma mechanism:Fall  Time/date of injury:9/26/23 at 1400  Known Injuries:  Left shoulder contusion    Other diagnoses:   N/A      Procedure:    Plan:  All imaging with negative for acute pathology including left shoulder, pain is likely from the force of him falling on his left side. If it continues he will need to follow up as an outpatient with either his primary care provider or orthopedic surgery for further evaluation.    Discussed with Dr. Pike    ETOH: This patient was asked if in the last 3-6 months there has been a time when he had  5 or more drinks in a single day/outing.. Patient answer to the screening question was in the negative. No intervention needed.  Primary Care Physician   No primary care provider on file.    Chief Complaint   Fall    History is obtained from the patient with the aid of an     History of Present Illness   [place] Darrius Nguyễn is a 23 year old male who presents with to the ED after a fall ~ 20 feet at work today onto the grass. He denies any LOC of head trauma. He complains of left shoulder pain and tingling. He has no dizziness, lightheadedness, generalized weakness. He was able to walk after the fall.     Past Medical History    No chronic medical problems    Past Surgical History   No previous surgeries    Prior to Admission Medications   None     Allergies   No Known Allergies    Social History   Social History     Socioeconomic History    Marital status: Not on file     Spouse name: Not on file    Number of children: Not on file    Years of education: Not on file    Highest education level: Not on file   Occupational History    Not on file   Tobacco Use    Smoking  status: Not on file    Smokeless tobacco: Not on file   Substance and Sexual Activity    Alcohol use: Not on file    Drug use: Not on file    Sexual activity: Not on file   Other Topics Concern    Not on file   Social History Narrative    Not on file     Social Determinants of Health     Financial Resource Strain: Not on file   Food Insecurity: Not on file   Transportation Needs: Not on file   Physical Activity: Not on file   Stress: Not on file   Social Connections: Not on file   Interpersonal Safety: Not on file   Housing Stability: Not on file       Family History   Non contributory    Review of Systems   CONSTITUTIONAL: No fever, chills, sweats, fatigue   EYES: no visual blurring, no double vision or visual loss  ENT: no decrease in hearing, no tinnitus, no vertigo, no hoarseness  RESPIRATORY: no shortness of breath, no cough, no sputum   CARDIOVASCULAR: no palpitations, no chest  pain, no exertional chest pain or pressure  GASTROINTESTINAL: no nausea or vomiting, or abd pain  GENITOURINARY: no dysuria, no frequency or hesitancy, no hematuria  MUSCULOSKELETAL: + left shoulder pain, neck, and back pain  SKIN: no rashes, ecchymoses, abrasions or lacerations  NEUROLOGIC: no numbness or tingling of hands, no numbness or tingling  of feet, no syncope, no tremors or weakness  PSYCHIATRIC: no sleep disturbances, no anxiety or depression    Physical Exam   Temp: 98.4  F (36.9  C) Temp src: Oral BP: 130/86 Pulse: 74   Resp: 18 SpO2: 99 %      Vital Signs with Ranges  Temp:  [98.4  F (36.9  C)] 98.4  F (36.9  C)  Pulse:  [74] 74  Resp:  [18] 18  BP: (130)/(86) 130/86  SpO2:  [99 %] 99 % 0 lbs 0 oz    Primary Survey:  Airway: patient talking  Breathing: symmetric respiratory effort bilaterally  Circulation: central pulses present and peripheral pulses present  Disability: Pupils - left 4 mm and brisk, right 4 mm and brisk     [place] Coma Scale - Total 15/15  Eye Response (E): 4  4= spontaneous,  3= to verbal/voice, 2=  to  pain, 1= No response   Verbal Response (V): 5   5= Orientated, converses,  4= Confused, converses, 3= Inappropriate words,  2= Incomprehensible sounds,  1=No response   Motor Response (M): 6   6= Obeys commands, 5= Localizes to pain, 4= Withdrawal to pain, 3=Fexion to pain, 2= Extension to pain, 1= No response    Secondary Survey:  General: alert, oriented   Head: atraumatic, normocephalic, trachea midline  Eyes: PERRLA, pupils 4mm, EOMI, corneas and conjunctivae clear  Ears: non-inflamed external ear canals  Nose: nares patent  Mouth/Throat: no tongue lacerations  Neck:  + cervical collar present. Patient has no midline TTP but does has pain when turning head side to side.  Chest/Pulmonary: normal respiratory rate and rhythm,  bilateral clear breath sounds, no wheezes, rales or rhonchi, no chest wall tenderness or deformities,   Cardiovascular: S1, S2,  normal and regular rate and rhythm, no murmurs  Abdomen: soft, non-tender, no guarding, no rebound tenderness and no tenderness to palpation  : pelvis stable to lateral compression  Back/Spine: no deformity, + midline tenderness thoracic spine, no sacral tenderness,  no step-offs and no abrasions or contusions  Musculoskel/Extremities: normal extremities, full AROM of major of 3/4 joints without tenderness, edema, erythema, ecchymosis, or abrasions. + Left shoulder is TTP with decreased ROM. 2+ PP. No edema.   Hand: no gross deformities of hands or fingers. Full AROM of hand and fingers in flexion and extension.  strength equal and symmetric.   Skin: no rashes, laceration, ecchymosis, skin warm and dry.   Neuro: PERRLA, alert, oriented. CN II-XII grossly intact. No focal deficits. Strength 5/5 x 3 extremities. Decreased strength in left arm due to shoulder pain.  Sensation intact.  Psychiatric: affect/mood normal, cooperative, normal judgement/insight and memory intact      Data     Results for orders placed or performed during the hospital encounter of  09/26/23 (from the past 24 hour(s))   CBC with platelets differential    Narrative    The following orders were created for panel order CBC with platelets differential.  Procedure                               Abnormality         Status                     ---------                               -----------         ------                     CBC with platelets and d...[[id number]]                      In process                   Please view results for these tests on the individual orders.   [place] Draw    Narrative    The following orders were created for panel order [place] Draw.  Procedure                               Abnormality         Status                     ---------                               -----------         ------                     Extra Red Top Tube[[id number]]                               In process                 Extra Green Top (Lithium...[[id number]]                                                 Extra Purple Top Tube[[id number]]                                                         Please view results for these tests on the individual orders.   ABO/Rh type and screen    Narrative    The following orders were created for panel order ABO/Rh type and screen.  Procedure                               Abnormality         Status                     ---------                               -----------         ------                     Adult Type and Screen[[id number]]                            In process                   Please view results for these tests on the individual orders.   Creatinine POCT   Result Value Ref Range    Creatinine POCT 0.8 0.7 - 1.3 mg/dL    GFR, ESTIMATED POCT >60 >60 mL/min/1.73m2   CT Cervical Spine w/o Contrast    Narrative    EXAM: CT CERVICAL SPINE W/O CONTRAST, CT LUMBAR SPINE W/O CONTRAST, CT THORACIC SPINE W/O CONTRAST, CT HEAD W/O CONTRAST  LOCATION: [facility]  DATE: 9/26/2023    INDICATION: Head, neck, and back  injury  COMPARISON: None.  TECHNIQUE:   1) Routine CT Head without IV contrast. Multiplanar reformats. Dose reduction techniques were used.   2) Routine CT Cervical Spine without IV contrast. Multiplanar reformats. Dose reduction techniques were used.   3) Routine CT Thoracic Spine without IV contrast. Multiplanar reformats. Dose reduction techniques were used.   4) Routine CT Lumbar Spine without IV contrast. Multiplanar reformats. Dose reduction techniques were used.     FINDINGS:   HEAD CT:   INTRACRANIAL CONTENTS: No intracranial hemorrhage, extraaxial collection, or mass effect.  No CT evidence of acute infarct. Normal parenchymal attenuation. Normal ventricles and sulci.     VISUALIZED ORBITS/SINUSES/MASTOIDS: No intraorbital abnormality. No paranasal sinus mucosal disease. No middle ear or mastoid effusion.    BONES/SOFT TISSUES: No acute abnormality.    CERVICAL SPINE CT:  VERTEBRA: Normal vertebral body heights. Straightening of cervical lordosis. Slight right cervical curve. No fracture or posttraumatic subluxation.     CANAL/FORAMINA: No canal or neural foraminal stenosis.    PARASPINAL: No extraspinal abnormality. Visualized lung fields are clear.    THORACIC SPINE CT:  VERTEBRA: Normal vertebral body heights. No fracture or posttraumatic subluxation.     CANAL/FORAMINA: No canal or neural foraminal stenosis.    PARASPINAL: No extraspinal abnormality.    LUMBAR SPINE CT:  VERTEBRA: Normal vertebral body heights. Straightening of lumbar lordosis. No fracture or posttraumatic subluxation.     CANAL/FORAMINA: No canal or neural foraminal stenosis.    PARASPINAL: No extraspinal abnormality.      Impression    IMPRESSION:  HEAD CT:  Normal head CT.    CERVICAL SPINE CT:  1.  No fracture or posttraumatic subluxation.  2.  No high-grade spinal canal or neural foraminal stenosis.    THORACIC SPINE CT:  No high-grade spinal canal or neural foraminal stenosis.    LUMBAR SPINE CT:  1.  No fracture or posttraumatic  subluxation.  2.  No high-grade spinal canal or neural foraminal stenosis.     CT Head w/o Contrast    Narrative    EXAM: CT CERVICAL SPINE W/O CONTRAST, CT LUMBAR SPINE W/O CONTRAST, CT THORACIC SPINE W/O CONTRAST, CT HEAD W/O CONTRAST  LOCATION: [facility]  DATE: 9/26/2023    INDICATION: Head, neck, and back injury  COMPARISON: None.  TECHNIQUE:   1) Routine CT Head without IV contrast. Multiplanar reformats. Dose reduction techniques were used.   2) Routine CT Cervical Spine without IV contrast. Multiplanar reformats. Dose reduction techniques were used.   3) Routine CT Thoracic Spine without IV contrast. Multiplanar reformats. Dose reduction techniques were used.   4) Routine CT Lumbar Spine without IV contrast. Multiplanar reformats. Dose reduction techniques were used.     FINDINGS:   HEAD CT:   INTRACRANIAL CONTENTS: No intracranial hemorrhage, extraaxial collection, or mass effect.  No CT evidence of acute infarct. Normal parenchymal attenuation. Normal ventricles and sulci.     VISUALIZED ORBITS/SINUSES/MASTOIDS: No intraorbital abnormality. No paranasal sinus mucosal disease. No middle ear or mastoid effusion.    BONES/SOFT TISSUES: No acute abnormality.    CERVICAL SPINE CT:  VERTEBRA: Normal vertebral body heights. Straightening of cervical lordosis. Slight right cervical curve. No fracture or posttraumatic subluxation.     CANAL/FORAMINA: No canal or neural foraminal stenosis.    PARASPINAL: No extraspinal abnormality. Visualized lung fields are clear.    THORACIC SPINE CT:  VERTEBRA: Normal vertebral body heights. No fracture or posttraumatic subluxation.     CANAL/FORAMINA: No canal or neural foraminal stenosis.    PARASPINAL: No extraspinal abnormality.    LUMBAR SPINE CT:  VERTEBRA: Normal vertebral body heights. Straightening of lumbar lordosis. No fracture or posttraumatic subluxation.     CANAL/FORAMINA: No canal or neural foraminal  stenosis.    PARASPINAL: No extraspinal abnormality.      Impression    IMPRESSION:  HEAD CT:  Normal head CT.    CERVICAL SPINE CT:  1.  No fracture or posttraumatic subluxation.  2.  No high-grade spinal canal or neural foraminal stenosis.    THORACIC SPINE CT:  No high-grade spinal canal or neural foraminal stenosis.    LUMBAR SPINE CT:  1.  No fracture or posttraumatic subluxation.  2.  No high-grade spinal canal or neural foraminal stenosis.     XR Shoulder Left Port G/E 2 Views    Narrative    EXAM: XR SHOULDER LEFT PORT G/E 2 VIEWS  LOCATION: [facility]  DATE: 9/27/2023    INDICATION: Fall at work from 20 meters height, now with pain; tech note: pt came to us on cervical precautions so I had to keep him flat and use tube angles for the rotated views.  COMPARISON: None available.      Impression    IMPRESSION: Multiple views of the left shoulder were obtained. No evidence of acute fracture or dislocation.   CT Chest/Abdomen/Pelvis w Contrast    Narrative    EXAM: CT CHEST/ABDOMEN/PELVIS W CONTRAST  LOCATION: [facility]  DATE/TIME: 9/27/2023 12:05 AM CDT    INDICATION: Fall/pain.  COMPARISON: None available.  TECHNIQUE: CT scan of the chest, abdomen, and pelvis was performed after the injection of iopamidol (ISOVUE-370) solution 111 mL intravenously. Multiplanar reformats were obtained. Dose reduction techniques were used.     FINDINGS:    MEDIASTINUM/AXILLAE: No cardiomegaly. Trace pericardial effusion. No significant mediastinal, hilar or axillary lymphadenopathy.    LUNGS AND PLEURA: No pleural effusion or pneumothorax. Bibasilar pulmonary opacities, likely atelectasis.    CORONARY ARTERY CALCIFICATION: None.    ABDOMEN/PELVIS:    HEPATOBILIARY: No suspicious focal hepatic lesion. The gallbladder is unremarkable.    PANCREAS: No main pancreatic ductal dilatation or definite solid pancreatic mass.    SPLEEN: No  splenomegaly.    ADRENAL GLANDS: No adrenal nodules.    KIDNEYS/BLADDER: No radiodense kidney/ureteral stones or hydronephrosis in either kidney. Small renal cyst at the upper pole of the right kidney.    BOWEL: No abnormally dilated bowel loops. The appendix is visualized and appears normal.    PERITONEUM: No evidence of free fluid in the abdomen and pelvis. No free peritoneal or portal venous gas.    PELVIC ORGANS: Unremarkable.    VASCULATURE: Unremarkable.    LYMPH NODES: Unremarkable.    MUSCULOSKELETAL: No suspicious osseous lesion. Please refer to concurrently performed thoracic and lumbar spine CT for findings related to thoracic and lumbar spine.      Impression    IMPRESSION:  1.  No evidence of acute traumatic injury in the chest, abdomen or pelvis. Please refer to concurrently performed thoracic and lumbar spine CT for findings related to the thoracic and lumbar spine.   CT Thoracic Spine w/o Contrast    Narrative    EXAM: CT CERVICAL SPINE W/O CONTRAST, CT LUMBAR SPINE W/O CONTRAST, CT THORACIC SPINE W/O CONTRAST, CT HEAD W/O CONTRAST  LOCATION: [facility]  DATE: 9/26/2023    INDICATION: Head, neck, and back injury  COMPARISON: None.  TECHNIQUE:   1) Routine CT Head without IV contrast. Multiplanar reformats. Dose reduction techniques were used.   2) Routine CT Cervical Spine without IV contrast. Multiplanar reformats. Dose reduction techniques were used.   3) Routine CT Thoracic Spine without IV contrast. Multiplanar reformats. Dose reduction techniques were used.   4) Routine CT Lumbar Spine without IV contrast. Multiplanar reformats. Dose reduction techniques were used.     FINDINGS:   HEAD CT:   INTRACRANIAL CONTENTS: No intracranial hemorrhage, extraaxial collection, or mass effect.  No CT evidence of acute infarct. Normal parenchymal attenuation. Normal ventricles and sulci.     VISUALIZED ORBITS/SINUSES/MASTOIDS: No intraorbital abnormality. No  paranasal sinus mucosal disease. No middle ear or mastoid effusion.    BONES/SOFT TISSUES: No acute abnormality.    CERVICAL SPINE CT:  VERTEBRA: Normal vertebral body heights. Straightening of cervical lordosis. Slight right cervical curve. No fracture or posttraumatic subluxation.     CANAL/FORAMINA: No canal or neural foraminal stenosis.    PARASPINAL: No extraspinal abnormality. Visualized lung fields are clear.    THORACIC SPINE CT:  VERTEBRA: Normal vertebral body heights. No fracture or posttraumatic subluxation.     CANAL/FORAMINA: No canal or neural foraminal stenosis.    PARASPINAL: No extraspinal abnormality.    LUMBAR SPINE CT:  VERTEBRA: Normal vertebral body heights. Straightening of lumbar lordosis. No fracture or posttraumatic subluxation.     CANAL/FORAMINA: No canal or neural foraminal stenosis.    PARASPINAL: No extraspinal abnormality.      Impression    IMPRESSION:  HEAD CT:  Normal head CT.    CERVICAL SPINE CT:  1.  No fracture or posttraumatic subluxation.  2.  No high-grade spinal canal or neural foraminal stenosis.    THORACIC SPINE CT:  No high-grade spinal canal or neural foraminal stenosis.    LUMBAR SPINE CT:  1.  No fracture or posttraumatic subluxation.  2.  No high-grade spinal canal or neural foraminal stenosis.     CT Lumbar Spine w/o Contrast    Narrative    EXAM: CT CERVICAL SPINE W/O CONTRAST, CT LUMBAR SPINE W/O CONTRAST, CT THORACIC SPINE W/O CONTRAST, CT HEAD W/O CONTRAST  LOCATION: [facility]  DATE: 9/26/2023    INDICATION: Head, neck, and back injury  COMPARISON: None.  TECHNIQUE:   1) Routine CT Head without IV contrast. Multiplanar reformats. Dose reduction techniques were used.   2) Routine CT Cervical Spine without IV contrast. Multiplanar reformats. Dose reduction techniques were used.   3) Routine CT Thoracic Spine without IV contrast. Multiplanar reformats. Dose reduction techniques were used.   4) Routine CT Lumbar Spine  without IV contrast. Multiplanar reformats. Dose reduction techniques were used.     FINDINGS:   HEAD CT:   INTRACRANIAL CONTENTS: No intracranial hemorrhage, extraaxial collection, or mass effect.  No CT evidence of acute infarct. Normal parenchymal attenuation. Normal ventricles and sulci.     VISUALIZED ORBITS/SINUSES/MASTOIDS: No intraorbital abnormality. No paranasal sinus mucosal disease. No middle ear or mastoid effusion.    BONES/SOFT TISSUES: No acute abnormality.    CERVICAL SPINE CT:  VERTEBRA: Normal vertebral body heights. Straightening of cervical lordosis. Slight right cervical curve. No fracture or posttraumatic subluxation.     CANAL/FORAMINA: No canal or neural foraminal stenosis.    PARASPINAL: No extraspinal abnormality. Visualized lung fields are clear.    THORACIC SPINE CT:  VERTEBRA: Normal vertebral body heights. No fracture or posttraumatic subluxation.     CANAL/FORAMINA: No canal or neural foraminal stenosis.    PARASPINAL: No extraspinal abnormality.    LUMBAR SPINE CT:  VERTEBRA: Normal vertebral body heights. Straightening of lumbar lordosis. No fracture or posttraumatic subluxation.     CANAL/FORAMINA: No canal or neural foraminal stenosis.    PARASPINAL: No extraspinal abnormality.      Impression    IMPRESSION:  HEAD CT:  Normal head CT.    CERVICAL SPINE CT:  1.  No fracture or posttraumatic subluxation.  2.  No high-grade spinal canal or neural foraminal stenosis.    THORACIC SPINE CT:  No high-grade spinal canal or neural foraminal stenosis.    LUMBAR SPINE CT:  1.  No fracture or posttraumatic subluxation.  2.  No high-grade spinal canal or neural foraminal stenosis.